# Patient Record
Sex: FEMALE | Race: WHITE | Employment: FULL TIME | URBAN - METROPOLITAN AREA
[De-identification: names, ages, dates, MRNs, and addresses within clinical notes are randomized per-mention and may not be internally consistent; named-entity substitution may affect disease eponyms.]

---

## 2017-01-20 ENCOUNTER — OFFICE VISIT (OUTPATIENT)
Dept: INTERNAL MEDICINE CLINIC | Facility: CLINIC | Age: 62
End: 2017-01-20

## 2017-01-20 VITALS
TEMPERATURE: 97.8 F | SYSTOLIC BLOOD PRESSURE: 125 MMHG | RESPIRATION RATE: 18 BRPM | HEIGHT: 68 IN | HEART RATE: 63 BPM | BODY MASS INDEX: 22.13 KG/M2 | DIASTOLIC BLOOD PRESSURE: 82 MMHG | WEIGHT: 146 LBS

## 2017-01-20 DIAGNOSIS — Z00.00 PERIODIC HEALTH ASSESSMENT, GENERAL SCREENING, ADULT: Primary | ICD-10-CM

## 2017-01-20 NOTE — PROGRESS NOTES
Chief Complaint   Patient presents with    Results     here to follow up on results     1. Have you been to the ER, urgent care clinic since your last visit? Hospitalized since your last visit? No    2. Have you seen or consulted any other health care providers outside of the [address] [place] [address] since your last visit? Include any pap smears or colon screening.  No

## 2017-08-03 ENCOUNTER — HOSPITAL ENCOUNTER (OUTPATIENT)
Dept: MAMMOGRAPHY | Age: 62
Discharge: HOME | End: 2017-08-03
Attending: INTERNAL MEDICINE
Payer: COMMERCIAL

## 2017-08-03 DIAGNOSIS — Z12.31 VISIT FOR SCREENING MAMMOGRAM: ICD-10-CM

## 2017-08-03 PROCEDURE — 77067 SCR MAMMO BI INCL CAD: CPT

## 2017-08-09 ENCOUNTER — OFFICE VISIT (OUTPATIENT)
Dept: INTERNAL MEDICINE CLINIC | Facility: CLINIC | Age: 62
End: 2017-08-09

## 2017-08-09 VITALS
BODY MASS INDEX: 23.05 KG/M2 | RESPIRATION RATE: 18 BRPM | TEMPERATURE: 96.2 F | WEIGHT: 152.1 LBS | SYSTOLIC BLOOD PRESSURE: 130 MMHG | HEART RATE: 64 BPM | HEIGHT: 68 IN | DIASTOLIC BLOOD PRESSURE: 71 MMHG | OXYGEN SATURATION: 100 %

## 2017-08-09 DIAGNOSIS — M54.2 NECK PAIN: Primary | ICD-10-CM

## 2017-08-09 DIAGNOSIS — M54.50 CHRONIC BILATERAL LOW BACK PAIN WITHOUT SCIATICA: ICD-10-CM

## 2017-08-09 DIAGNOSIS — M62.830 MUSCLE SPASM OF BACK: ICD-10-CM

## 2017-08-09 DIAGNOSIS — G89.29 CHRONIC BILATERAL LOW BACK PAIN WITHOUT SCIATICA: ICD-10-CM

## 2017-08-09 NOTE — PROGRESS NOTES
Room 8    Chief Complaint   Patient presents with    Stiff Neck     Patient needs massage and requesting a note to indicate such     1. Have you been to the ER, urgent care clinic since your last visit? Hospitalized since your last visit? Yes Reason for visit: To [facility] in May for cracked ribs    2. Have you seen or consulted any other health care providers outside of the [address] [place] [address] since your last visit? Include any pap smears or colon screening.  Yes Where: [facility]     Health Maintenance Due   Topic Date Due    Hepatitis C Screening  1955    FOBT Q 1 YEAR AGE 50-75  05/17/2007    ZOSTER VACCINE AGE 60>  03/17/2017    INFLUENZA AGE 9 TO ADULT  08/01/2017

## 2017-08-09 NOTE — ACP (ADVANCE CARE PLANNING)
Discussed with pt if she had any ACP in place. Pt stated she did not,  but would like some information. An [employer]  information packet was provided. Suggested patient look over information with her family and desired person/persons who would fulfill her wishes  for her and to make an appointment with ACP facilitator at our office.

## 2017-08-09 NOTE — PATIENT INSTRUCTIONS
Neck Spasm: Exercises  Your Care Instructions  Here are some examples of typical rehabilitation exercises for your condition. Start each exercise slowly. Ease off the exercise if you start to have pain. Your doctor or physical therapist will tell you when you can start these exercises and which ones will work best for you. How to do the exercises  Levator scapula stretch    1. Sit in a firm chair, or stand up straight. 2. Gently tilt your head toward your left shoulder. 3. Turn your head to look down into your armpit, bending your head slightly forward. Let the weight of your head stretch your neck muscles. 4. Hold for 15 to 30 seconds. 5. Return to your starting position. 6. Follow the same instructions above, but tilt your head toward your right shoulder. 7. Repeat 2 to 4 times toward each shoulder. Upper trapezius stretch    1. Sit in a firm chair, or stand up straight. 2. This stretch works best if you keep your shoulder down as you lean away from it. To help you remember to do this, start by relaxing your shoulders and lightly holding on to your thighs or your chair. 3. Tilt your head toward your shoulder and hold for 15 to 30 seconds. Let the weight of your head stretch your muscles. 4. If you would like a little added stretch, place your arm behind your back. Use the arm opposite of the direction you are tilting your head. For example, if you are tilting your head to the left, place your right arm behind your back. 5. Repeat 2 to 4 times toward each shoulder. Neck rotation    1. Sit in a firm chair, or stand up straight. 2. Keeping your chin level, turn your head to the right, and hold for 15 to 30 seconds. 3. Turn your head to the left, and hold for 15 to 30 seconds. 4. Repeat 2 to 4 times to each side. Chin tuck    1. Lie on the floor with a rolled-up towel under your neck. Your head should be touching the floor. 2. Slowly bring your chin toward the front of your neck.   3. Hold for a count of 6, and then relax for up to 10 seconds. 4. Repeat 8 to 12 times. Forward neck flexion    1. Sit in a firm chair, or stand up straight. 2. Bend your head forward. 3. Hold for 15 to 30 seconds, then return to your starting position. 4. Repeat 2 to 4 times. Follow-up care is a key part of your treatment and safety. Be sure to make and go to all appointments, and call your doctor if you are having problems. It's also a good idea to know your test results and keep a list of the medicines you take. Where can you learn more? Go to http://taylor-shey.info/. Enter P962 in the search box to learn more about \"Neck Spasm: Exercises. \"  Current as of: March 21, 2017  Content Version: 11.3  © 5294-6253 [employer]. Care instructions adapted under license by [employer] (which disclaims liability or warranty for this information). If you have questions about a medical condition or this instruction, always ask your healthcare professional. [place] [address] any warranty or liability for your use of this information.

## 2017-08-09 NOTE — LETTER
8/9/2017 9:06 AM 
 
Ms. Benedict Goncalves [address] [id number] To Whom It May Concern,  
 
Ms. Benedict Goncalves medically requires massage therapy for the following conditions: ICD-10-CM ICD-9-CM 1. Neck pain M54.2 723.1 REFERRAL TO MASSAGE THERAPY 2. Muscle spasm of back M62.830 724.8 REFERRAL TO MASSAGE THERAPY 3. Chronic bilateral low back pain without sciatica M54.5 724.2 REFERRAL TO MASSAGE THERAPY  
 G89.29 338.29 Sincerely, Sherrill Alejandra PA-C

## 2017-08-09 NOTE — PROGRESS NOTES
HISTORY OF PRESENT ILLNESS  Mickey Card is a 63 y.o. female. HPI  1) Neck stiffness/back pain   Exercising - walking 3 miles qam now. Trying to lose weight. Review of Systems   Constitutional: Negative for fever and malaise/fatigue. Musculoskeletal: Positive for back pain, myalgias and neck pain. Negative for falls and joint pain. Psychiatric/Behavioral: Negative for depression. Physical Exam   Constitutional: She is oriented to person, place, and time. She appears well-developed and well-nourished. No distress. HENT:   Head: Normocephalic and atraumatic. Neck: Neck supple. No JVD present. Cardiovascular: Normal rate, regular rhythm and normal heart sounds. Pulmonary/Chest: Effort normal and breath sounds normal. No respiratory distress. Musculoskeletal: Normal range of motion. She exhibits no edema. SLR negative B. Hip rotation B normal. B trap mm with ++ spasm. Lumbar spinal musculature B with + spasm. Neurological: She is alert and oriented to person, place, and time. Skin: Skin is warm and dry. Psychiatric: She has a normal mood and affect. Her behavior is normal. Judgment and thought content normal.   Nursing note and vitals reviewed. ASSESSMENT and PLAN    ICD-10-CM ICD-9-CM    1.  Neck pain M54.2 723.1 REFERRAL TO MASSAGE THERAPY   2. Muscle spasm of back M62.830 724.8 REFERRAL TO MASSAGE THERAPY   3. Chronic bilateral low back pain without sciatica M54.5 724.2 REFERRAL TO MASSAGE THERAPY    G89.29 338.29

## 2017-08-25 ENCOUNTER — HOSPITAL ENCOUNTER (OUTPATIENT)
Dept: MAMMOGRAPHY | Age: 62
Discharge: HOME | End: 2017-08-25
Attending: PHYSICIAN ASSISTANT
Payer: COMMERCIAL

## 2017-08-25 DIAGNOSIS — Z78.0 POSTMENOPAUSAL: ICD-10-CM

## 2017-08-25 PROCEDURE — 77080 DXA BONE DENSITY AXIAL: CPT

## 2017-08-28 PROBLEM — M85.89 OSTEOPENIA OF MULTIPLE SITES: Status: ACTIVE | Noted: 2017-08-28

## 2017-08-28 NOTE — PROGRESS NOTES
Hi Ms. Debbie Guadalupe scan shows that you have osteopenia (\"Pre-osteoporosis\") in your hip and spine. Please Take OTC Vitamin D3 5000 units once daily, OTC Calcium Citrate 500 mg twice daily, and increase weight bearing exercise. Recheck this scan in 2 years.    Shiela Pineda

## 2017-12-04 ENCOUNTER — OFFICE VISIT (OUTPATIENT)
Dept: INTERNAL MEDICINE CLINIC | Facility: CLINIC | Age: 62
End: 2017-12-04

## 2017-12-04 VITALS
BODY MASS INDEX: 23.19 KG/M2 | WEIGHT: 153 LBS | OXYGEN SATURATION: 98 % | SYSTOLIC BLOOD PRESSURE: 147 MMHG | HEIGHT: 68 IN | TEMPERATURE: 97.9 F | DIASTOLIC BLOOD PRESSURE: 78 MMHG | RESPIRATION RATE: 18 BRPM | HEART RATE: 63 BPM

## 2017-12-04 DIAGNOSIS — R03.0 ELEVATED BP WITHOUT DIAGNOSIS OF HYPERTENSION: ICD-10-CM

## 2017-12-04 DIAGNOSIS — J01.90 ACUTE NON-RECURRENT SINUSITIS, UNSPECIFIED LOCATION: Primary | ICD-10-CM

## 2017-12-04 RX ORDER — SULFAMETHOXAZOLE AND TRIMETHOPRIM 800; 160 MG/1; MG/1
1 TABLET ORAL 2 TIMES DAILY
Qty: 14 TAB | Refills: 0 | Status: SHIPPED | OUTPATIENT
Start: 2017-12-04 | End: 2019-06-17

## 2017-12-04 NOTE — PROGRESS NOTES
HISTORY OF PRESENT ILLNESS  Karina Sheets is a 63 y.o. female. Chief Complaint   Patient presents with    Sinus Infection    Cough     HPI  1) Day #12 of illness - taking Dayquil/Nyquil/Mucinex without relief. PND. Cough - sometimes productive and nasal mucus is green. Some bleeding left nostril. No fever. BP Readings from Last 3 Encounters:   12/04/17 147/78   08/09/17 130/71   01/20/17 125/82     Wt Readings from Last 3 Encounters:   12/04/17 153 lb (69.4 kg)   08/09/17 152 lb 1.6 oz (69 kg)   01/20/17 146 lb (66.2 kg)     2) BP is elevated in the office today and remains elevated at recheck. Pt has not had excess salt in her diet, has not gained weight, and has had normal BPs at prior visits. Review of Systems   Constitutional: Positive for malaise/fatigue. Negative for fever. HENT: Positive for congestion. Negative for ear pain. Respiratory: Positive for cough and sputum production. Negative for shortness of breath. Neurological: Positive for headaches. Endo/Heme/Allergies: Negative for environmental allergies. Physical Exam   Constitutional: She is oriented to person, place, and time. She appears well-developed and well-nourished. No distress. HENT:   Head: Normocephalic and atraumatic. Mouth/Throat: Oropharynx is clear and moist.   Bilateral TMs with fluid. No erythema. Nasal mucosa red, inflamed. Eyes: Conjunctivae are normal.   Neck: Neck supple. Cardiovascular: Normal rate, regular rhythm and normal heart sounds. Pulmonary/Chest: Effort normal and breath sounds normal.   Lymphadenopathy:     She has no cervical adenopathy. Neurological: She is alert and oriented to person, place, and time. Skin: Skin is warm and dry. Nursing note and vitals reviewed. ASSESSMENT and PLAN    ICD-10-CM ICD-9-CM    1. Acute non-recurrent sinusitis, unspecified location J01.90 461.9 trimethoprim-sulfamethoxazole (BACTRIM DS, SEPTRA DS) 160-800 mg per tablet   2.  Elevated BP without diagnosis of hypertension R03.0 796.2 Suspect this is elevated secondary to illness. Monitor at home and f/u if BP does not improve later this week.

## 2017-12-04 NOTE — PROGRESS NOTES
Chief Complaint   Patient presents with    Sinus Infection    Cough     1. Have you been to the ER, urgent care clinic since your last visit? Hospitalized since your last visit? No    2. Have you seen or consulted any other health care providers outside of the [address] [place] [address] since your last visit? Include any pap smears or colon screening.  No

## 2018-09-24 ENCOUNTER — HOSPITAL ENCOUNTER (OUTPATIENT)
Dept: MAMMOGRAPHY | Age: 63
Discharge: HOME | End: 2018-09-24
Attending: INTERNAL MEDICINE
Payer: COMMERCIAL

## 2018-09-24 DIAGNOSIS — Z12.31 VISIT FOR SCREENING MAMMOGRAM: ICD-10-CM

## 2018-09-24 PROCEDURE — 77067 SCR MAMMO BI INCL CAD: CPT

## 2019-06-17 ENCOUNTER — OFFICE VISIT (OUTPATIENT)
Dept: INTERNAL MEDICINE CLINIC | Facility: CLINIC | Age: 64
End: 2019-06-17

## 2019-06-17 VITALS
DIASTOLIC BLOOD PRESSURE: 72 MMHG | HEART RATE: 66 BPM | SYSTOLIC BLOOD PRESSURE: 113 MMHG | RESPIRATION RATE: 16 BRPM | BODY MASS INDEX: 22.88 KG/M2 | TEMPERATURE: 98.4 F | HEIGHT: 68 IN | WEIGHT: 151 LBS

## 2019-06-17 DIAGNOSIS — R06.09 DOE (DYSPNEA ON EXERTION): ICD-10-CM

## 2019-06-17 DIAGNOSIS — Z00.00 PHYSICAL EXAM: ICD-10-CM

## 2019-06-17 DIAGNOSIS — D72.819 LEUKOPENIA, UNSPECIFIED TYPE: Primary | ICD-10-CM

## 2019-06-17 DIAGNOSIS — Z12.4 PAP SMEAR FOR CERVICAL CANCER SCREENING: ICD-10-CM

## 2019-06-17 DIAGNOSIS — E78.5 HYPERLIPIDEMIA, UNSPECIFIED HYPERLIPIDEMIA TYPE: ICD-10-CM

## 2019-06-17 RX ORDER — HYDROGEN PEROXIDE 3 %
SOLUTION, NON-ORAL MISCELLANEOUS DAILY
COMMUNITY

## 2019-06-17 NOTE — PROGRESS NOTES
Chief Complaint   Patient presents with    Physical     physical w/pap. 1. Have you been to the ER, urgent care clinic since your last visit? Hospitalized since your last visit? Yes Where: ER Reason for visit: Dog bites. 2. Have you seen or consulted any other health care providers outside of the [address] [place] [address] since your last visit? Include any pap smears or colon screening.  No

## 2019-06-17 NOTE — PROGRESS NOTES
Subjective:      Sabi Reid is a 60 y.o. female who presents today for   Chief Complaint   Patient presents with    Physical     physical w/pap. patient in today for complete physical    Chronic abdominal pain- has seen GI    Patient has a complaint of sob over the last year. Mainly notices with exertion. She has a brother recently diagnosed with cardiomyopathy and told his heart was \"thick\"    Pap screen  paps have been normal in the past  Last pap was in 2016  Postmenopausal  No HRT      DEXA   2017 osteopenia  Takes Vit D    Colonoscopy  History of Polyps   Father with colon CA  Sees GI every 5 years    Mammogram  Sept 2018    Immunizations  tdap 2019  Refuses flu vaccine  Refuses shingrix    Ophthalmologist sees annually    Dentist sees annually    Supplements- takes Vit D, lutein, Vit E, vegetable enzymes, melatonin    MARTINEZ- family hx heart problems, 20 lbs weight gain over last 2 years, no cp, no diaphoresis, no nausea, does not smoke, denies prior hx of hyperlipidemia   Denies any history of lung problems    Patient Active Problem List    Diagnosis Date Noted    Elevated BP without diagnosis of hypertension 12/04/2017    Osteopenia of multiple sites 08/28/2017    Chronic bilateral low back pain without sciatica 08/09/2017    Muscle spasm of back 08/09/2017    Neck pain 08/09/2017    Gastroesophageal reflux disease without esophagitis 03/15/2016    Pelvic pain 03/15/2016     Current Outpatient Medications   Medication Sig Dispense Refill    esomeprazole (NEXIUM) 20 mg capsule Take  by mouth daily.  trimethoprim-sulfamethoxazole (BACTRIM DS, SEPTRA DS) 160-800 mg per tablet Take 1 Tab by mouth two (2) times a day. 14 Tab 0    TURMERIC ROOT EXTRACT PO Take  by mouth. Allergies   Allergen Reactions    Penicillins Rash     History reviewed. No pertinent past medical history. History reviewed. No pertinent surgical history.   Family History   Problem Relation Age of Onset    Breast Cancer Mother         68'sand 80's    Diabetes Mother     Hypertension Mother      Social History     Tobacco Use    Smoking status: Never Smoker    Smokeless tobacco: Never Used   Substance Use Topics    Alcohol use: Yes     Comment: social        Review of Systems    A comprehensive review of systems was negative except for that written in the HPI. Objective:     Visit Vitals  /72   Pulse 66   Temp 98.4 °F (36.9 °C) (Oral)   Resp 16   Ht 5' 8\" (1.727 m)   Wt 151 lb (68.5 kg)   BMI 22.96 kg/m²     General:  Alert, cooperative, no distress, appears stated age. Head:  Normocephalic, without obvious abnormality, atraumatic. Eyes:  Conjunctivae/corneas clear. PERRL, EOMs intact. Fundi benign. Ears:  Normal TMs and external ear canals both ears. Nose: Nares normal. Septum midline. Mucosa normal. No drainage or sinus tenderness. Throat: Lips, mucosa, and tongue normal. Teeth and gums normal.   Neck: Supple, symmetrical, trachea midline, no adenopathy, thyroid: no enlargement/tenderness/nodules, no carotid bruit and no JVD. Back:   Symmetric, no curvature. ROM normal. No CVA tenderness. Lungs:   Clear to auscultation bilaterally. Chest wall:  No tenderness or deformity. Heart:  Regular rate and rhythm, S1, S2 normal, no murmur, click, rub or gallop. Abdomen:   Soft, non-tender. Bowel sounds normal. No masses,  No organomegaly. Extremities: Extremities normal, atraumatic, no cyanosis or edema. Pulses: 2+ and symmetric all extremities. Skin: Skin color, texture, turgor normal. No rashes or lesions. Lymph nodes: Cervical, supraclavicular, and axillary nodes normal.   Neurologic: CNII-XII intact. Normal strength, sensation and reflexes throughout.   Breast: symmetrical, no masses, no adenopathy  Pelvic: normal external genitalia, no CMT, moderate vaginal wall atrophy, normal adnexa       Assessment/Plan:       ICD-10-CM ICD-9-CM    1. Leukopenia, unspecified type D72.819 288.50 CBC WITH AUTOMATED DIFF  Advised patient she should repeat in one month   2. Hyperlipidemia, unspecified hyperlipidemia type E78.5 272.4 3 month trial of diet and lifestyle changes. Patient does not want to discuss starting medication at this time   3. MARTINEZ (dyspnea on exertion) R06.09 786.09 REFERRAL TO CARDIOLOGY      XR CHEST PA LAT   4. Physical exam Z00.00 V70.9 PAP IG, RFX HPV ASCU, 16&18,45([id number])      REFERRAL TO DERMATOLOGY   5. Pap smear for cervical cancer screening Z12.4 V76.2 PAP IG, RFX HPV ASCU, 89&23,15([id number])          Advised her to call back or return to office if symptoms worsen/change/persist.  Discussed expected course/resolution/complications of diagnosis in detail with patient. Medication risks/benefits/costs/interactions/alternatives discussed with patient. She was given an after visit summary which includes diagnoses, current medications, & vitals. She expressed understanding with the diagnosis and plan.

## 2019-06-20 LAB
CYTOLOGIST CVX/VAG CYTO: NORMAL
CYTOLOGY CVX/VAG DOC CYTO: NORMAL
CYTOLOGY CVX/VAG DOC THIN PREP: NORMAL
DX ICD CODE: NORMAL
LABCORP, 190119: NORMAL
Lab: NORMAL
OTHER STN SPEC: NORMAL
STAT OF ADQ CVX/VAG CYTO-IMP: NORMAL

## 2019-07-09 ENCOUNTER — HOSPITAL ENCOUNTER (OUTPATIENT)
Dept: GENERAL RADIOLOGY | Age: 64
Discharge: HOME | End: 2019-07-09
Payer: COMMERCIAL

## 2019-07-09 DIAGNOSIS — R06.09 DOE (DYSPNEA ON EXERTION): ICD-10-CM

## 2019-07-09 PROCEDURE — 71046 X-RAY EXAM CHEST 2 VIEWS: CPT

## 2019-07-10 LAB
BASOPHILS # BLD AUTO: 0 X10E3/UL (ref 0–0.2)
BASOPHILS NFR BLD AUTO: 1 %
EOSINOPHIL # BLD AUTO: 0.1 X10E3/UL (ref 0–0.4)
EOSINOPHIL NFR BLD AUTO: 2 %
ERYTHROCYTE [DISTWIDTH] IN BLOOD BY AUTOMATED COUNT: 13.4 % (ref 12.3–15.4)
HCT VFR BLD AUTO: 39.2 % (ref 34–46.6)
HGB BLD-MCNC: 12.7 G/DL (ref 11.1–15.9)
IMM GRANULOCYTES # BLD AUTO: 0 X10E3/UL (ref 0–0.1)
IMM GRANULOCYTES NFR BLD AUTO: 0 %
LYMPHOCYTES # BLD AUTO: 1.3 X10E3/UL (ref 0.7–3.1)
LYMPHOCYTES NFR BLD AUTO: 35 %
MCH RBC QN AUTO: 30 PG (ref 26.6–33)
MCHC RBC AUTO-ENTMCNC: 32.4 G/DL (ref 31.5–35.7)
MCV RBC AUTO: 93 FL (ref 79–97)
MONOCYTES # BLD AUTO: 0.4 X10E3/UL (ref 0.1–0.9)
MONOCYTES NFR BLD AUTO: 10 %
NEUTROPHILS # BLD AUTO: 1.9 X10E3/UL (ref 1.4–7)
NEUTROPHILS NFR BLD AUTO: 52 %
PLATELET # BLD AUTO: 244 X10E3/UL (ref 150–450)
RBC # BLD AUTO: 4.24 X10E6/UL (ref 3.77–5.28)
WBC # BLD AUTO: 3.6 X10E3/UL (ref 3.4–10.8)

## 2019-08-05 ENCOUNTER — OFFICE VISIT (OUTPATIENT)
Dept: CARDIOLOGY CLINIC | Age: 64
End: 2019-08-05

## 2019-08-05 VITALS
HEART RATE: 63 BPM | OXYGEN SATURATION: 96 % | RESPIRATION RATE: 16 BRPM | WEIGHT: 156 LBS | DIASTOLIC BLOOD PRESSURE: 92 MMHG | BODY MASS INDEX: 23.64 KG/M2 | HEIGHT: 68 IN | SYSTOLIC BLOOD PRESSURE: 139 MMHG

## 2019-08-05 DIAGNOSIS — R06.00 DYSPNEA, UNSPECIFIED TYPE: Primary | ICD-10-CM

## 2019-08-05 DIAGNOSIS — E78.5 DYSLIPIDEMIA (HIGH LDL; LOW HDL): ICD-10-CM

## 2019-08-05 DIAGNOSIS — I10 ESSENTIAL HYPERTENSION: ICD-10-CM

## 2019-08-05 DIAGNOSIS — Z82.41 FAMILY HISTORY OF SUDDEN CARDIAC DEATH: ICD-10-CM

## 2019-08-05 NOTE — PATIENT INSTRUCTIONS
The only test I'd like to start with is an ECHOCARDIOGRAM. This is an ultrasound of your heart which can identify structural heart disease if present. Let's talk right after that to decide what else may be needed.

## 2019-08-05 NOTE — Clinical Note
I will start with an echo. We will probably take some talking to get her to agree to antihypertensive therapy and lipid management. Her urine analysis curiously showed a pH of 9.0.   This may be a repetition and investigation if it is real.  Thank you for this referral.

## 2019-08-05 NOTE — PROGRESS NOTES
This is an initial office visit for Ms. Jonathan Adler. She is a [address] female, primary patient of  Dr. Maximino Contreras. She is referred for cardiac assessment because of a family history of apical hypertrophic cardiomyopathy and CAD with stenting. Her brother passed oiut during exercise, was resuscitated, and subsequently discovered during hospital care to have asymmetric left ventricular apical hypertrophy (presumably obliterative type) but he also was found to have single-vessel coronary disease and he was stented. He was apparently exercising regularly and vigorously and passed out on the treadmill. He is not very communicative and this is as much information as the patient has available. Family history otherwise is remarkable in one case for congestive cardiomyopathy based on diabetes and ischemic disease and an aunt but otherwise there was no applicable information. She has gained 15 lbs last few months. She states this is because of a lapse in her usual regimen of caloric restriction and exercise. She had a stress test 2 years ago, reported negative (plain treadmill). She is noticing MARTINEZ but she is very active. Sometimes dyspnea with conversation. She has hiatal hernia with waxing and waning symptoms. Numbness in the right foot comes and goes unpredictably. On review of systems she is not aware whether she snores or not. She is  2 para 0 with no instances of unexpected fetal loss. There is no history of syncope but she does have several episodes of normal syncope widely  throughout her lifetime. She states that she \"browned out\" on a roller coaster, and she had a near fainting spell in a dentist's office when she saw blood but there were no instances of loss of consciousness. She does get occasional palpitations which she describes as rapid or pounding heartbeats that seem to occur daily with no related symptoms. She denies chest pain.   She had peripheral arterial studies done years ago without clinical indication as agreed to ED from a medical [facility] service. It was reported as normal.    She is on nothing but omeprazole in terms of prescription medications but she uses a broad collection of supplements, and she exercises regularly. She has no symptoms during exercise and the occasional palpitations or breathlessness occur entirely at rest.  For the most part she sleeps reasonably well. She offers no other complaints. She has had no exposure to tobacco or other than very briefly in early adolescence. She has had passive smoke exposure from a number of years working as a . On examination, she is a normally developed adult female appearing approximately her stated age. Height is 5 feet 8, weight is 156. Body mass index is 23.72. Blood pressure is 139/92. Respiratory rate is 16 without distress, room air  SPO2 is 96%. Jugular veins are flat in a seated position, there are no carotid bruits. Lungs are clear to auscultation. Cardiac auscultation is normal with regular rhythm, normal quality S1 and S2, no murmur, no audible gallop despite possible left atrial strain pattern on the EKG. Abdomen is negative for bruit, mass, pulsation, endorgan enlargement. Peripheral pulses are intact, there is no sign of DVT, there is no cyanosis or clubbing. Twelve-lead EKG recorded today shows mild sinus bradycardia with left atrial conduction delay. There is borderline first-degree AV block and mild left axis deviation. There is a minor disturbance of QRS conduction exclusively visible in V1 and V2 consistent with a distal right ventricular conduction delay. R wave progression is normal and there is no evidence of prior injury. Lab work from July shows a normal CBC. Urine analysis performed in June shows normal specific gravity, no unusual content, no mention of sediment, with pH greater than 9.0. This was not true in 2016 and may merit further investigation. Simultaneous serum bicarbonate level was 24 with serum sodium of 134. Serum creatinine was 0.7. Total cholesterol was elevated at 252 with HDL of 70 and LDL of 155. Hemoglobin A1c was 5.6.   Hepatic markers were normal.  Vitamin D levels have been normal.    Impression: Family history of asymmetric hypertrophic cardiomyopathy    Family history of cardiac syncope, possible risk of cardiac sudden death    There are no physical findings to support an impression of hypertrophic cardiomyopathy in this patient    Trivial conduction abnormality in the twelve-lead EKG may not be important at all    Finding of unusual alkaline urine may merit repetition and further investigation    Plan:  Echocardiography as the only tested first    Will discuss further management after the echo    She probably will need to be on hypertensive medication to be selected after the echo    She will need a discussion about lipid-lowering therapy and available options    Will defer to Dr. Lesvia Linder regarding investigation of unusual alkaline urine    Hodan Gan MD, [facility] [place]

## 2019-08-05 NOTE — PROGRESS NOTES
Chief Complaint   Patient presents with    New Patient    Shortness of Breath     Upon exertion    Dizziness     Upon exertion     1. Have you been to the ER, urgent care clinic since your last visit? Hospitalized since your last visit? No    2. Have you seen or consulted any other health care providers outside of the [address] [place] [address] since your last visit? Include any pap smears or colon screening.  No

## 2019-08-07 ENCOUNTER — OFFICE VISIT (OUTPATIENT)
Dept: INTERNAL MEDICINE CLINIC | Facility: CLINIC | Age: 64
End: 2019-08-07

## 2019-08-07 VITALS
BODY MASS INDEX: 23.04 KG/M2 | HEART RATE: 67 BPM | RESPIRATION RATE: 16 BRPM | HEIGHT: 68 IN | DIASTOLIC BLOOD PRESSURE: 82 MMHG | WEIGHT: 152 LBS | SYSTOLIC BLOOD PRESSURE: 136 MMHG | TEMPERATURE: 98.1 F

## 2019-08-07 DIAGNOSIS — K21.9 GASTROESOPHAGEAL REFLUX DISEASE WITHOUT ESOPHAGITIS: Primary | ICD-10-CM

## 2019-08-07 DIAGNOSIS — R03.0 ELEVATED BLOOD PRESSURE READING IN OFFICE WITHOUT DIAGNOSIS OF HYPERTENSION: ICD-10-CM

## 2019-08-07 DIAGNOSIS — Z87.898 HISTORY OF PREDIABETES: ICD-10-CM

## 2019-08-07 DIAGNOSIS — E78.5 HYPERLIPIDEMIA, UNSPECIFIED HYPERLIPIDEMIA TYPE: ICD-10-CM

## 2019-08-07 NOTE — PROGRESS NOTES
Chief Complaint   Patient presents with    Results     1. Have you been to the ER, urgent care clinic since your last visit? Hospitalized since your last visit? No    2. Have you seen or consulted any other health care providers outside of the [address] [place] [address] since your last visit? Include any pap smears or colon screening.  No

## 2019-08-07 NOTE — PROGRESS NOTES
Subjective:      Hayes Morales is a 60 y.o. female who presents today for   Chief Complaint   Patient presents with    Results     Patient in today for follow up    Has seen cardio in reference to family history of cardiomyopathy- echo is pending    Elevated BP- blood pressure varies on ov readings and at home    Hyperlipidemia- diet controlled    History of prediabetes- last A1c showed improvement at 5.6      Patient Active Problem List    Diagnosis Date Noted    Elevated BP without diagnosis of hypertension 12/04/2017    Osteopenia of multiple sites 08/28/2017    Chronic bilateral low back pain without sciatica 08/09/2017    Muscle spasm of back 08/09/2017    Neck pain 08/09/2017    Gastroesophageal reflux disease without esophagitis 03/15/2016    Pelvic pain 03/15/2016     Current Outpatient Medications   Medication Sig Dispense Refill    esomeprazole (NEXIUM) 20 mg capsule Take  by mouth daily. Allergies   Allergen Reactions    Penicillins Rash     History reviewed. No pertinent past medical history. History reviewed. No pertinent surgical history. Family History   Problem Relation Age of Onset    Breast Cancer Mother         68'sand 80's    Diabetes Mother     Hypertension Mother      Social History     Tobacco Use    Smoking status: Never Smoker    Smokeless tobacco: Never Used   Substance Use Topics    Alcohol use: Yes     Comment: social        Review of Systems    A comprehensive review of systems was negative except for that written in the HPI. Objective:     Visit Vitals  /82   Pulse 67   Temp 98.1 °F (36.7 °C) (Oral)   Resp 16   Ht 5' 8\" (1.727 m)   Wt 152 lb (68.9 kg)   BMI 23.11 kg/m²     General:  Alert, cooperative, no distress, appears stated age. Head:  Normocephalic, without obvious abnormality, atraumatic. Eyes:  Conjunctivae/corneas clear. PERRL, EOMs intact. Ears:  Normal TMs and external ear canals both ears. Nose: Nares normal. Septum midline. Mucosa normal. No drainage or sinus tenderness. Throat: Lips, mucosa, and tongue normal. Teeth and gums normal.   Neck: Supple, symmetrical, trachea midline, no adenopathy, thyroid: no enlargement/tenderness/nodules, no carotid bruit and no JVD. Back:   Symmetric, no curvature. ROM normal. No CVA tenderness. Lungs:   Clear to auscultation bilaterally. Chest wall:  No tenderness or deformity. Heart:  Regular rate and rhythm, S1, S2 normal, no murmur, click, rub or gallop. Abdomen:   Soft, non-tender. Bowel sounds normal. No masses,  No organomegaly. Extremities: Extremities normal, atraumatic, no cyanosis or edema. Pulses: 2+ and symmetric all extremities. Skin: Skin color, texture, turgor normal. No rashes or lesions. Lymph nodes: Cervical, supraclavicular, and axillary nodes normal.   Neurologic: CNII-XII intact. Normal strength, sensation and reflexes throughout. Assessment/Plan:       ICD-10-CM ICD-9-CM    1. Gastroesophageal reflux disease without esophagitis K21.9 530.81 Continue PPI   2. Elevated blood pressure reading in office without diagnosis of hypertension R03.0 796.2 Monitor BP at next ov and will log BP at home  Reduce salt  Increase exercise     3. Hyperlipidemia, unspecified hyperlipidemia type E78.5 272.4 3 months lifestyle changes  Low chol diet and exercise   4. History of prediabetes Z87.898 V12.29 Dietary modifications  exercise          Advised her to call back or return to office if symptoms worsen/change/persist.  Discussed expected course/resolution/complications of diagnosis in detail with patient. Medication risks/benefits/costs/interactions/alternatives discussed with patient. She was given an after visit summary which includes diagnoses, current medications, & vitals. She expressed understanding with the diagnosis and plan.

## 2019-08-13 ENCOUNTER — HOSPITAL ENCOUNTER (OUTPATIENT)
Dept: NON INVASIVE DIAGNOSTICS | Age: 64
Discharge: HOME | End: 2019-08-13
Attending: INTERNAL MEDICINE

## 2019-08-13 DIAGNOSIS — R06.00 DYSPNEA, UNSPECIFIED TYPE: ICD-10-CM

## 2019-08-13 DIAGNOSIS — Z82.41 FAMILY HISTORY OF SUDDEN CARDIAC DEATH: ICD-10-CM

## 2019-08-19 ENCOUNTER — HOSPITAL ENCOUNTER (OUTPATIENT)
Dept: NON INVASIVE DIAGNOSTICS | Age: 64
Discharge: HOME | End: 2019-08-19
Attending: INTERNAL MEDICINE
Payer: COMMERCIAL

## 2019-08-19 VITALS
BODY MASS INDEX: 23.04 KG/M2 | DIASTOLIC BLOOD PRESSURE: 82 MMHG | HEIGHT: 68 IN | SYSTOLIC BLOOD PRESSURE: 136 MMHG | WEIGHT: 152 LBS

## 2019-08-19 LAB
ECHO AO ROOT DIAM: 3.3 CM
ECHO AV PEAK GRADIENT: 4.8 MMHG
ECHO AV PEAK VELOCITY: 109.16 CM/S
ECHO LA MAJOR AXIS: 3.57 CM
ECHO LA TO AORTIC ROOT RATIO: 1.08
ECHO LV INTERNAL DIMENSION DIASTOLIC: 4.41 CM (ref 3.9–5.3)
ECHO LV INTERNAL DIMENSION SYSTOLIC: 2.9 CM
ECHO LV IVSD: 0.91 CM (ref 0.6–0.9)
ECHO LV MASS 2D: 146.9 G (ref 67–162)
ECHO LV MASS INDEX 2D: 80.8 G/M2 (ref 43–95)
ECHO LV POSTERIOR WALL DIASTOLIC: 0.89 CM (ref 0.6–0.9)
ECHO MV A VELOCITY: 70.44 CM/S
ECHO MV AREA PHT: 3.4 CM2
ECHO MV E DECELERATION TIME (DT): 222.5 MS
ECHO MV E VELOCITY: 63.42 CM/S
ECHO MV E/A RATIO: 0.9
ECHO MV PRESSURE HALF TIME (PHT): 64.5 MS
ECHO PV MAX VELOCITY: 67.76 CM/S
ECHO PV PEAK GRADIENT: 1.8 MMHG
ECHO RV INTERNAL DIMENSION: 3.19 CM
ECHO RV TAPSE: 1.91 CM (ref 1.5–2)
ECHO TV REGURGITANT MAX VELOCITY: 249.89 CM/S
ECHO TV REGURGITANT PEAK GRADIENT: 25 MMHG

## 2019-08-19 PROCEDURE — 0399T ECHO ADULT COMPLETE: CPT

## 2019-08-30 ENCOUNTER — OFFICE VISIT (OUTPATIENT)
Dept: CARDIOLOGY CLINIC | Age: 64
End: 2019-08-30

## 2019-08-30 VITALS
DIASTOLIC BLOOD PRESSURE: 88 MMHG | HEART RATE: 68 BPM | WEIGHT: 155 LBS | SYSTOLIC BLOOD PRESSURE: 144 MMHG | RESPIRATION RATE: 16 BRPM | HEIGHT: 68 IN | BODY MASS INDEX: 23.49 KG/M2 | OXYGEN SATURATION: 99 %

## 2019-08-30 DIAGNOSIS — I10 ESSENTIAL HYPERTENSION: ICD-10-CM

## 2019-08-30 DIAGNOSIS — E78.5 DYSLIPIDEMIA (HIGH LDL; LOW HDL): ICD-10-CM

## 2019-08-30 DIAGNOSIS — Z82.41 FAMILY HISTORY OF SUDDEN CARDIAC DEATH: ICD-10-CM

## 2019-08-30 DIAGNOSIS — R06.00 DYSPNEA, UNSPECIFIED TYPE: Primary | ICD-10-CM

## 2019-08-30 NOTE — PROGRESS NOTES
Chief Complaint   Patient presents with    Follow-up    Results     Echo 8/19/19    Breathing Problem    Hypertension     No HTN diagnosis     1. Have you been to the ER, urgent care clinic since your last visit? Hospitalized since your last visit? No    2. Have you seen or consulted any other health care providers outside of the [address] [place] [address] since your last visit? Include any pap smears or colon screening.  No

## 2019-08-30 NOTE — PATIENT INSTRUCTIONS
Please follow your home blood pressure and record it, but no more than once a day. I would not be eager to add drug therapy for today's blood pressure. If I were going to add something intended mostly to blunt a stress response, it would be something in the family of Beta Blockers. I will leave this to Dr. Cassie Begum but I can be called whenever either of you wish.

## 2019-10-15 NOTE — PROGRESS NOTES
Jasmine Bowles is a 60year old lady, primary patient of Dr. Flynn Arriaga, first referred for cardiac evaluation in July of this year because of a complaint of dyspnea. After a missed appointment, she was first evaluated on August 5, 2019. She has a family history of asymmetric hypertrophic cardiomyopathy and cardiac sudden death in a male sublingt, who also turned out to have single vessel CAD. She also has another relative with congestive cardiomyopathy. She underwent echocardiography priot to treatment selection:    Echo on 8/19/19 was reported as follows:    · Left Ventricle: Normal cavity size, wall thickness and systolic function (ejection fraction normal). Estimated left ventricular ejection fraction is 61 - 65%. Visually measured ejection fraction. No regional wall motion abnormality noted. Normal left ventricular strain. Moderate (grade 2) left ventricular diastolic dysfunction with reversible defects. No ventricular septal defect present in the left ventricle. · Pulmonary Artery: There is no evidence of pulmonary hypertension. · Interatrial Septum: Color flow Doppler was used. · Aortic Valve: Mild aortic valve leaflet calcification present. There is concentric LVH with high ejection fraction and without any regional abnormalities. There is moderate diastolic impairment. There is mild calcification of the aortic annulus but leaflet motion is normal and the valve is only trivially insufficient. Cardiac output is well-maintained, no other abnormalities are noted.      Echo Findings     Left Ventricle Normal cavity size, wall thickness and systolic function (ejection fraction normal). No left ventricular speckled myocardium observed. The muscle mass is normal. The cavity shape is normal. Normal septal motion. The estimated ejection fraction is 61 - 65%. Visually measured ejection fraction. No regional wall motion abnormality noted. End-systolic volume is normal. Normal left ventricular strain. Strain JEN: -24. There is moderate (grade 2) left ventricular diastolic dysfunction with reversible defects. Normal left ventricular diastolic pressure. End-diastolic volume is normal. The findings are consistent with hypertrophic cardiomyopathy without obstruction. No ventricular septal defect. No left ventricular non-compaction. No spontaneous echo contrastThere is no thrombus. There is no mass. Left ventricle is hypertrophic in a concentric pattern without obstruction and with an above normal ejection fraction as well as moderate diastolic impairment. There are no regional abnormalities. Wall Scoring The left ventricular wall motion is normal.            Left Atrium Normal cavity size. No atrial septal defect present. No patent foramen ovale visualized. There is no thrombus. There is no mass. No spontaneous echo contrast. Interatrial septal bowing left to right. Left atrial appendage not assessed. Right Ventricle Normal cavity size, wall thickness and global systolic function. There is no thrombus. No mass is present. Right Atrium Normal cavity size and volume. There is no thrombus. There is no mass. No spontaneous echo contrast used. Interatrial Septum Color flow Doppler was used. No atrial septal defect present. Interatrial septal bowing left to right. No interatrial septal aneurysm present. . There appears to be no lipomatous hypertrophy of the interatrial septum. Aortic Valve Normal valve structure, trileaflet valve structure, no stenosis and no regurgitation. There is mild left leaflet calcification. Normal aortic leaflet mobility. There is no vegetation on the aortic valve. There is no mass on the aortic valve. Mitral Valve Normal valve structure, no stenosis and no regurgitation. There is no vegetation on the mitral valve. Tricuspid Valve Normal valve structure, no stenosis and no regurgitation. There is no evidence of pulmonary hypertension.    Pulmonic Valve Pulmonic valve not well visualized. Normal valve structure, no stenosis and no regurgitation. Aorta Normal aortic root, ascending aortic, and aortic arch. IVC/Hepatic Veins Normal structure. Normal central venous pressure (0-5 mmHg); IVC diameter is less than 21 mm and collapses more than 50% with respiration. Pericardium Normal pericardium and no evidence of pericardial effusion. She offers no new complaints at this time. She specifically denies lightheadedness, syncope, palpitations, episodes of weakness, paroxysmal nocturnal dyspnea, orthopnea, and any other sleep disturbance. She is noticing exertional dyspnea during the day but she exercises vigorously as previously noted. There is no chest pain involved. On examination, she weighs 155 pounds. Height is 5 feet 8 inches, body mass index is 23.57. Pulse is 68 and regular. Respirations unlabored with room air SPO2 of 99%. Blood pressure is 144/88 in the left arm. Cardiac auscultation shows regular rhythm with normal quality S1 and S2. There is no gallop or murmur. PMI is normal.  Peripheral pulses are intact, lungs are clear, abdomen is negative for pulsation and bruit. Neck vessels are normal.    Impression: Labile hypertension    Concentric type left ventricular hypertrophy without obstruction and without regional abnormalities    With no immediate need to add medication, but given the family history my empiric choice would be a beta-blocker    Plan:  Home blood pressure diary    No change in treatment today    Return to the care of Dr. Danny Corcoran    Return to this office as needed    If blood pressure treatment is initiated and dyspnea on exertion does not improve we will consider stress testing in the future.     Thank you for this referral    Eva Shaikh MD, [facility]

## 2019-12-18 ENCOUNTER — HOSPITAL ENCOUNTER (OUTPATIENT)
Dept: ULTRASOUND IMAGING | Age: 64
Discharge: HOME | End: 2019-12-18
Payer: COMMERCIAL

## 2019-12-18 DIAGNOSIS — R10.11 RIGHT UPPER QUADRANT PAIN: ICD-10-CM

## 2019-12-18 PROCEDURE — 76705 ECHO EXAM OF ABDOMEN: CPT

## 2019-12-30 ENCOUNTER — HOSPITAL ENCOUNTER (OUTPATIENT)
Dept: GENERAL RADIOLOGY | Age: 64
Discharge: HOME | End: 2019-12-30
Payer: COMMERCIAL

## 2019-12-30 DIAGNOSIS — R06.02 SHORTNESS OF BREATH: ICD-10-CM

## 2019-12-30 PROCEDURE — 71046 X-RAY EXAM CHEST 2 VIEWS: CPT

## 2020-01-10 ENCOUNTER — OFFICE VISIT (OUTPATIENT)
Dept: INTERNAL MEDICINE CLINIC | Age: 65
End: 2020-01-10

## 2020-01-10 VITALS
BODY MASS INDEX: 23.9 KG/M2 | RESPIRATION RATE: 18 BRPM | TEMPERATURE: 98.1 F | HEART RATE: 62 BPM | SYSTOLIC BLOOD PRESSURE: 144 MMHG | WEIGHT: 157.7 LBS | DIASTOLIC BLOOD PRESSURE: 80 MMHG | OXYGEN SATURATION: 99 % | HEIGHT: 68 IN

## 2020-01-10 DIAGNOSIS — R10.9 ABDOMINAL DISCOMFORT: ICD-10-CM

## 2020-01-10 DIAGNOSIS — K21.9 GASTROESOPHAGEAL REFLUX DISEASE WITHOUT ESOPHAGITIS: Primary | ICD-10-CM

## 2020-01-10 DIAGNOSIS — Z12.39 BREAST CANCER SCREENING: ICD-10-CM

## 2020-01-10 NOTE — PROGRESS NOTES
Chief Complaint   Patient presents with    Abdominal Pain     1. Have you been to the ER, urgent care clinic since your last visit? Hospitalized since your last visit? Yes, seen at [facility] Dec 20 for right rib pain. 2. Have you seen or consulted any other health care providers outside of the [address] [place] [address] since your last visit? Include any pap smears or colon screening.  No

## 2020-01-10 NOTE — PROGRESS NOTES
Subjective:      Kayli Fam is a 60 y.o. female who presents today for   Chief Complaint   Patient presents with    Abdominal Pain     hypertension- has seen cardio, monitors blood pressure, not placed on any antihypertensive at this time  Echo done and reviewed    She has abd pain at baseline, no real changes, she tries to eat  gluten free, and is lactose intolerant    Had dull ache under ribs on right. She was seen at [facility], she had blood work and ultrasound  No significant findings  She took ibuprofen which did help      Patient Active Problem List    Diagnosis Date Noted    Elevated BP without diagnosis of hypertension 12/04/2017    Osteopenia of multiple sites 08/28/2017    Chronic bilateral low back pain without sciatica 08/09/2017    Muscle spasm of back 08/09/2017    Neck pain 08/09/2017    Gastroesophageal reflux disease without esophagitis 03/15/2016    Pelvic pain 03/15/2016     Current Outpatient Medications   Medication Sig Dispense Refill    fluticasone furoate (FLONASE SENSIMIST NA) by Nasal route.  esomeprazole (NEXIUM) 20 mg capsule Take  by mouth daily. Allergies   Allergen Reactions    Penicillins Rash     History reviewed. No pertinent past medical history. History reviewed. No pertinent surgical history. Family History   Problem Relation Age of Onset    Breast Cancer Mother         68'sand 80's    Diabetes Mother     Hypertension Mother      Social History     Tobacco Use    Smoking status: Never Smoker    Smokeless tobacco: Never Used   Substance Use Topics    Alcohol use: Yes     Comment: social        Review of Systems    A comprehensive review of systems was negative except for that written in the HPI. Objective:     Visit Vitals  /80   Pulse 62   Temp 98.1 °F (36.7 °C) (Oral)   Resp 18   Ht 5' 8\" (1.727 m)   Wt 157 lb 11.2 oz (71.5 kg)   SpO2 99%   BMI 23.98 kg/m²     General:  Alert, cooperative, no distress, appears stated age.    Head: Normocephalic, without obvious abnormality, atraumatic. Eyes:  Conjunctivae/corneas clear. PERRL, EOMs intact. Fundi benign. Ears:  Normal TMs and external ear canals both ears. Nose: Nares normal. Septum midline. Mucosa normal. No drainage or sinus tenderness. Throat: Lips, mucosa, and tongue normal. Teeth and gums normal.   Neck: Supple, symmetrical, trachea midline, no adenopathy, thyroid: no enlargement/tenderness/nodules, no carotid bruit and no JVD. Back:   Symmetric, no curvature. ROM normal. No CVA tenderness. Lungs:   Clear to auscultation bilaterally. Chest wall:  No tenderness or deformity. Heart:  Regular rate and rhythm, S1, S2 normal, no murmur, click, rub or gallop. Abdomen:   Soft, non-tender. Bowel sounds normal. No masses,  No organomegaly. Tender along lower ribs on right   Extremities: Extremities normal, atraumatic, no cyanosis or edema. Pulses: 2+ and symmetric all extremities. Assessment/Plan:       ICD-10-CM ICD-9-CM    1. Gastroesophageal reflux disease without esophagitis K21.9 530.81 Continue nexium  Avoid reflux triggers     2. Abdominal discomfort R10.9 789.00 Likely costochondritis  Negative RUQ ultrasound  Discussed NSAIDS, muscle relaxers  Patient will monitor and follow up with me if persists   3. Breast cancer screening Z12.39 V76.10 [facility] MAMMO BI SCREENING INCL CAD          Advised her to call back or return to office if symptoms worsen/change/persist.  Discussed expected course/resolution/complications of diagnosis in detail with patient. Medication risks/benefits/costs/interactions/alternatives discussed with patient. She was given an after visit summary which includes diagnoses, current medications, & vitals. She expressed understanding with the diagnosis and plan.

## 2020-04-01 ENCOUNTER — HOSPITAL ENCOUNTER (OUTPATIENT)
Dept: PULMONOLOGY | Age: 65
Discharge: HOME | End: 2020-04-01
Attending: INTERNAL MEDICINE

## 2020-04-01 DIAGNOSIS — J98.4 SMALL AIRWAYS DISEASE: ICD-10-CM

## 2020-06-23 ENCOUNTER — HOSPITAL ENCOUNTER (OUTPATIENT)
Dept: MAMMOGRAPHY | Age: 65
Discharge: HOME | End: 2020-06-23
Attending: INTERNAL MEDICINE
Payer: COMMERCIAL

## 2020-06-23 DIAGNOSIS — Z12.39 BREAST CANCER SCREENING: ICD-10-CM

## 2020-06-23 PROCEDURE — 77067 SCR MAMMO BI INCL CAD: CPT

## 2020-09-09 ENCOUNTER — OFFICE VISIT (OUTPATIENT)
Dept: INTERNAL MEDICINE CLINIC | Age: 65
End: 2020-09-09
Payer: COMMERCIAL

## 2020-09-09 VITALS
SYSTOLIC BLOOD PRESSURE: 144 MMHG | DIASTOLIC BLOOD PRESSURE: 77 MMHG | RESPIRATION RATE: 16 BRPM | TEMPERATURE: 97.6 F | BODY MASS INDEX: 23.79 KG/M2 | WEIGHT: 157 LBS | HEART RATE: 65 BPM | OXYGEN SATURATION: 100 % | HEIGHT: 68 IN

## 2020-09-09 DIAGNOSIS — Z23 ENCOUNTER FOR IMMUNIZATION: Primary | ICD-10-CM

## 2020-09-09 DIAGNOSIS — D72.819 LEUKOPENIA, UNSPECIFIED TYPE: ICD-10-CM

## 2020-09-09 DIAGNOSIS — I10 ESSENTIAL HYPERTENSION: ICD-10-CM

## 2020-09-09 DIAGNOSIS — E55.9 VITAMIN D DEFICIENCY: ICD-10-CM

## 2020-09-09 DIAGNOSIS — Z87.898 HISTORY OF PREDIABETES: ICD-10-CM

## 2020-09-09 DIAGNOSIS — Z01.818 PREOP EXAMINATION: ICD-10-CM

## 2020-09-09 DIAGNOSIS — K21.9 GASTROESOPHAGEAL REFLUX DISEASE WITHOUT ESOPHAGITIS: ICD-10-CM

## 2020-09-09 DIAGNOSIS — E78.5 HYPERLIPIDEMIA, UNSPECIFIED HYPERLIPIDEMIA TYPE: ICD-10-CM

## 2020-09-09 PROCEDURE — 99214 OFFICE O/P EST MOD 30 MIN: CPT

## 2020-09-09 PROCEDURE — 90686 IIV4 VACC NO PRSV 0.5 ML IM: CPT

## 2020-09-09 RX ORDER — ALBUTEROL SULFATE 90 UG/1
2 AEROSOL, METERED RESPIRATORY (INHALATION)
Qty: 1 INHALER | Refills: 1 | Status: SHIPPED | OUTPATIENT
Start: 2020-09-09

## 2020-09-09 RX ORDER — DOCUSATE SODIUM 100 MG/1
100 CAPSULE, LIQUID FILLED ORAL 2 TIMES DAILY
Qty: 60 CAP | Refills: 2 | Status: SHIPPED | OUTPATIENT
Start: 2020-09-09 | End: 2020-12-08

## 2020-09-09 NOTE — PROGRESS NOTES
Chief Complaint   Patient presents with    Pre-op Exam     1. Have you been to the ER, urgent care clinic since your last visit? Hospitalized since your last visit? No    2. Have you seen or consulted any other health care providers outside of the [address] [place] [address] since your last visit? Include any pap smears or colon screening.  Yes seen by Dr. Mendez pulmonologist.

## 2020-09-09 NOTE — PROGRESS NOTES
Subjective:      Alex Willett is a 63 y.o. female who presents today for   Chief Complaint   Patient presents with    Pre-op Exam   see pre op exam scanned in chart  Would like a flu vaccine today  Would like to try albuterol as needed. She has seen pulmonary and has questionable diagnosis of asthma  Continues to have abdominal discomfort at times, constipation- she has plans to see GI    Patient Active Problem List    Diagnosis Date Noted    Elevated BP without diagnosis of hypertension 12/04/2017    Osteopenia of multiple sites 08/28/2017    Chronic bilateral low back pain without sciatica 08/09/2017    Muscle spasm of back 08/09/2017    Neck pain 08/09/2017    Gastroesophageal reflux disease without esophagitis 03/15/2016    Pelvic pain 03/15/2016     Current Outpatient Medications   Medication Sig Dispense Refill    fluticasone furoate (FLONASE SENSIMIST NA) by Nasal route.  esomeprazole (NEXIUM) 20 mg capsule Take  by mouth daily. Allergies   Allergen Reactions    Penicillins Rash     History reviewed. No pertinent past medical history. History reviewed. No pertinent surgical history. Family History   Problem Relation Age of Onset    Breast Cancer Mother         68'sand 80's    Diabetes Mother     Hypertension Mother      Social History     Tobacco Use    Smoking status: Never Smoker    Smokeless tobacco: Never Used   Substance Use Topics    Alcohol use: Yes     Comment: social        Review of Systems    A comprehensive review of systems was negative except for that written in the HPI. Objective:     Visit Vitals  /77   Pulse 65   Temp 97.6 °F (36.4 °C) (Oral)   Resp 16   Ht 5' 8\" (1.727 m)   Wt 157 lb (71.2 kg)   SpO2 100%   BMI 23.87 kg/m²     General:  Alert, cooperative, no distress, appears stated age. Head:  Normocephalic, without obvious abnormality, atraumatic. Eyes:  Conjunctivae/corneas clear. PERRL, EOMs intact. Fundi benign.    Ears:  Normal TMs and external ear canals both ears. Nose: Nares normal. Septum midline. Mucosa normal. No drainage or sinus tenderness. Throat: Lips, mucosa, and tongue normal. Teeth and gums normal.   Neck: Supple, symmetrical, trachea midline, no adenopathy, thyroid: no enlargement/tenderness/nodules, no carotid bruit and no JVD. Back:   Symmetric, no curvature. ROM normal. No CVA tenderness. Lungs:   Clear to auscultation bilaterally. Chest wall:  No tenderness or deformity. Heart:  Regular rate and rhythm, S1, S2 normal, no murmur, click, rub or gallop. Abdomen:   Soft, non-tender. Bowel sounds normal. No masses,  No organomegaly. Extremities: Extremities normal, atraumatic, no cyanosis or edema. Pulses: 2+ and symmetric all extremities. Skin: Skin color, texture, turgor normal. No rashes or lesions. Lymph nodes: Cervical, supraclavicular, and axillary nodes normal.   Neurologic: CNII-XII intact. Normal strength, sensation and reflexes throughout. Assessment/Plan:       ICD-10-CM ICD-9-CM    1. Encounter for immunization  Z23 V03.89 INFLUENZA VIRUS VAC QUAD,SPLIT,PRESV FREE SYRINGE IM      [place] IMMUNIZ ADMIN,1 SINGLE/COMB VAC/TOXOID   2. Gastroesophageal reflux disease without esophagitis  K21.9 530.81    3. Hyperlipidemia, unspecified hyperlipidemia type  E78.5 272.4 NMR LIPOPROFILE WITH LIPIDS (WITHOUT GRAPH)   4. History of prediabetes  Z87.898 V12.29 HEMOGLOBIN A1C W/O EAG   5. Leukopenia, unspecified type  D72.819 288.50 CBC WITH AUTOMATED DIFF   6. Essential hypertension  K97 651.0 METABOLIC PANEL, COMPREHENSIVE      URINALYSIS W/ RFLX MICROSCOPIC   7. Vitamin D deficiency  E55.9 268.9 VITAMIN D, 25 HYDROXY   8. Preop examination  Z01.818 V72.84           Advised her to call back or return to office if symptoms worsen/change/persist.  Discussed expected course/resolution/complications of diagnosis in detail with patient.     Medication risks/benefits/costs/interactions/alternatives discussed with patient. She was given an after visit summary which includes diagnoses, current medications, & vitals. She expressed understanding with the diagnosis and plan.

## 2020-10-30 LAB
25(OH)D3+25(OH)D2 SERPL-MCNC: 28.2 NG/ML (ref 30–100)
ALBUMIN SERPL-MCNC: 4.6 G/DL (ref 3.8–4.8)
ALBUMIN/GLOB SERPL: 2.2 {RATIO} (ref 1.2–2.2)
ALP SERPL-CCNC: 103 IU/L (ref 39–117)
ALT SERPL-CCNC: 16 IU/L (ref 0–32)
APPEARANCE UR: CLEAR
AST SERPL-CCNC: 18 IU/L (ref 0–40)
BASOPHILS # BLD AUTO: 0 X10E3/UL (ref 0–0.2)
BASOPHILS NFR BLD AUTO: 1 %
BILIRUB SERPL-MCNC: 0.4 MG/DL (ref 0–1.2)
BILIRUB UR QL STRIP: NEGATIVE
BUN SERPL-MCNC: 13 MG/DL (ref 8–27)
BUN/CREAT SERPL: 18 (ref 12–28)
CALCIUM SERPL-MCNC: 9.3 MG/DL (ref 8.7–10.3)
CHLORIDE SERPL-SCNC: 101 MMOL/L (ref 96–106)
CHOLEST SERPL-MCNC: 259 MG/DL (ref 100–199)
CO2 SERPL-SCNC: 21 MMOL/L (ref 20–29)
COLOR UR: YELLOW
CREAT SERPL-MCNC: 0.71 MG/DL (ref 0.57–1)
EOSINOPHIL # BLD AUTO: 0.1 X10E3/UL (ref 0–0.4)
EOSINOPHIL NFR BLD AUTO: 2 %
ERYTHROCYTE [DISTWIDTH] IN BLOOD BY AUTOMATED COUNT: 12.5 % (ref 11.7–15.4)
GLOBULIN SER CALC-MCNC: 2.1 G/DL (ref 1.5–4.5)
GLUCOSE SERPL-MCNC: 105 MG/DL (ref 65–99)
GLUCOSE UR QL: NEGATIVE
HBA1C MFR BLD: 5.6 % (ref 4.8–5.6)
HCT VFR BLD AUTO: 38.6 % (ref 34–46.6)
HDL SERPL-SCNC: 30.6 UMOL/L
HDLC SERPL-MCNC: 62 MG/DL
HGB BLD-MCNC: 12.9 G/DL (ref 11.1–15.9)
HGB UR QL STRIP: NEGATIVE
IMM GRANULOCYTES # BLD AUTO: 0 X10E3/UL (ref 0–0.1)
IMM GRANULOCYTES NFR BLD AUTO: 0 %
KETONES UR QL STRIP: NEGATIVE
LDL SERPL QN: 22 NM
LDL SERPL-SCNC: 1744 NMOL/L
LDL SMALL SERPL-SCNC: 235 NMOL/L
LDLC SERPL CALC-MCNC: 181 MG/DL (ref 0–99)
LEUKOCYTE ESTERASE UR QL STRIP: NEGATIVE
LP-IR SCORE SERPL: <25
LYMPHOCYTES # BLD AUTO: 1.3 X10E3/UL (ref 0.7–3.1)
LYMPHOCYTES NFR BLD AUTO: 38 %
MCH RBC QN AUTO: 30.4 PG (ref 26.6–33)
MCHC RBC AUTO-ENTMCNC: 33.4 G/DL (ref 31.5–35.7)
MCV RBC AUTO: 91 FL (ref 79–97)
MICRO URNS: ABNORMAL
MONOCYTES # BLD AUTO: 0.3 X10E3/UL (ref 0.1–0.9)
MONOCYTES NFR BLD AUTO: 10 %
NEUTROPHILS # BLD AUTO: 1.7 X10E3/UL (ref 1.4–7)
NEUTROPHILS NFR BLD AUTO: 49 %
NITRITE UR QL STRIP: NEGATIVE
PH UR STRIP: 8 [PH] (ref 5–7.5)
PLATELET # BLD AUTO: 232 X10E3/UL (ref 150–450)
POTASSIUM SERPL-SCNC: 4.5 MMOL/L (ref 3.5–5.2)
PROT SERPL-MCNC: 6.7 G/DL (ref 6–8.5)
PROT UR QL STRIP: NEGATIVE
RBC # BLD AUTO: 4.25 X10E6/UL (ref 3.77–5.28)
SODIUM SERPL-SCNC: 135 MMOL/L (ref 134–144)
SP GR UR: 1.02 (ref 1–1.03)
TRIGL SERPL-MCNC: 94 MG/DL (ref 0–149)
UROBILINOGEN UR STRIP-MCNC: 1 MG/DL (ref 0.2–1)
WBC # BLD AUTO: 3.4 X10E3/UL (ref 3.4–10.8)

## 2020-12-07 ENCOUNTER — VIRTUAL VISIT (OUTPATIENT)
Dept: INTERNAL MEDICINE CLINIC | Age: 65
End: 2020-12-07
Payer: COMMERCIAL

## 2020-12-07 DIAGNOSIS — R06.00 DYSPNEA, UNSPECIFIED TYPE: ICD-10-CM

## 2020-12-07 DIAGNOSIS — E78.5 HYPERLIPIDEMIA, UNSPECIFIED HYPERLIPIDEMIA TYPE: Primary | ICD-10-CM

## 2020-12-07 PROCEDURE — 99214 OFFICE O/P EST MOD 30 MIN: CPT | Performed by: INTERNAL MEDICINE

## 2020-12-07 RX ORDER — ROSUVASTATIN CALCIUM 5 MG/1
5 TABLET, COATED ORAL
Qty: 30 TAB | Refills: 3 | Status: SHIPPED | OUTPATIENT
Start: 2020-12-07

## 2020-12-07 NOTE — PROGRESS NOTES
Keyonna Doss is a 63 y.o. female who was seen by synchronous (real-time) audio-video technology on 12/7/2020 for No chief complaint on file. Assessment & Plan:   Diagnoses and all orders for this visit:    1. Hyperlipidemia, unspecified hyperlipidemia type  -     REFERRAL TO CARDIOLOGY    2. Dyspnea, unspecified type  -     REFERRAL TO CARDIOLOGY    Other orders  -     rosuvastatin (CRESTOR) 5 mg tablet; Take 1 Tab by mouth nightly. Reviewed NMR lipoprofile with patient and discussed in detail, I have advised statin therapy and reviewed most common side effects including myalgias and transaminitis. I let patient know we would watch her closely. She states she would like to get a second opinion from cardiology. Referral to Dr. Mary Lou Rogers      Subjective:     Hyperlipidemia:  Patient in today for follow up visit and to discuss cholesterol    Vit D deficiency: Also recent labs showed low vit D  Has started supplement      Prediabetes:  A1c 5.6- shows improvement! Prior to Admission medications    Medication Sig Start Date End Date Taking? Authorizing Provider   docusate sodium (COLACE) 100 mg capsule Take 1 Cap by mouth two (2) times a day for 90 days. 9/9/20 12/8/20  Estefani Hope MD   albuterol (PROVENTIL HFA, VENTOLIN HFA, PROAIR HFA) 90 mcg/actuation inhaler Take 2 Puffs by inhalation every four (4) hours as needed for Wheezing or Shortness of Breath. 9/9/20   Estefani Hope MD   fluticasone furoate (FLONASE SENSIMIST NA) by Nasal route. Provider, Historical   esomeprazole (NEXIUM) 20 mg capsule Take  by mouth daily. Provider, Historical         Review of Systems   Constitutional: Negative for weight loss. Eyes: Negative for blurred vision. Respiratory: Negative for shortness of breath. Cardiovascular: Negative for chest pain and leg swelling. Genitourinary: Negative for frequency and urgency. Musculoskeletal: Negative for joint pain. Neurological: Negative for headaches. Objective:   No flowsheet data found. [INSTRUCTIONS:  \"[x]\" Indicates a positive item  \"[]\" Indicates a negative item  -- DELETE ALL ITEMS NOT EXAMINED]    Constitutional: [x] Appears well-developed and well-nourished [x] No apparent distress      [] Abnormal -     Mental status: [x] Alert and awake  [x] Oriented to person/place/time [x] Able to follow commands    [] Abnormal -     Eyes:   EOM    [x]  Normal    [] Abnormal -   Sclera  [x]  Normal    [] Abnormal -          Discharge [x]  None visible   [] Abnormal -     HENT: [x] Normocephalic, atraumatic  [] Abnormal -   [x] Mouth/Throat: Mucous membranes are moist    External Ears [x] Normal  [] Abnormal -    Neck: [x] No visualized mass [] Abnormal -     Pulmonary/Chest: [x] Respiratory effort normal   [x] No visualized signs of difficulty breathing or respiratory distress        [] Abnormal -      Musculoskeletal:   [x] Normal gait with no signs of ataxia         [x] Normal range of motion of neck        [] Abnormal -     Neurological:        [x] No Facial Asymmetry (Cranial nerve 7 motor function) (limited exam due to video visit)          [x] No gaze palsy        [] Abnormal -          Skin:        [x] No significant exanthematous lesions or discoloration noted on facial skin         [] Abnormal -            Psychiatric:       [x] Normal Affect [] Abnormal -        [x] No Hallucinations    Other pertinent observable physical exam findings:-        We discussed the expected course, resolution and complications of the diagnosis(es) in detail. Medication risks, benefits, costs, interactions, and alternatives were discussed as indicated. I advised her to contact the office if her condition worsens, changes or fails to improve as anticipated. She expressed understanding with the diagnosis(es) and plan.        Rylan Duarte, who was evaluated through a patient-initiated, synchronous (real-time) audio-video encounter, and/or her healthcare decision maker, is aware that it is a billable service, with coverage as determined by her insurance carrier. She provided verbal consent to proceed: Yes, and patient identification was verified. It was conducted pursuant to the emergency declaration under the [address] [place] [address] authority and the [employer] Act. A caregiver was present when appropriate. Ability to conduct physical exam was limited. I was at home. The patient was at home.       Esa Cochran MD

## 2021-10-21 ENCOUNTER — TRANSCRIBE ORDER (OUTPATIENT)
Dept: SCHEDULING | Age: 66
End: 2021-10-21

## 2021-10-21 DIAGNOSIS — Z12.31 ENCOUNTER FOR MAMMOGRAM TO ESTABLISH BASELINE MAMMOGRAM: Primary | ICD-10-CM

## 2021-11-23 ENCOUNTER — HOSPITAL ENCOUNTER (OUTPATIENT)
Dept: MAMMOGRAPHY | Age: 66
Discharge: HOME | End: 2021-11-23
Attending: OBSTETRICS & GYNECOLOGY
Payer: COMMERCIAL

## 2021-11-23 DIAGNOSIS — Z12.31 ENCOUNTER FOR MAMMOGRAM TO ESTABLISH BASELINE MAMMOGRAM: ICD-10-CM

## 2021-11-23 PROCEDURE — 77063 BREAST TOMOSYNTHESIS BI: CPT

## 2022-03-18 PROBLEM — R03.0 ELEVATED BP WITHOUT DIAGNOSIS OF HYPERTENSION: Status: ACTIVE | Noted: 2017-12-04

## 2022-03-19 PROBLEM — M54.2 NECK PAIN: Status: ACTIVE | Noted: 2017-08-09

## 2022-03-19 PROBLEM — M62.830 MUSCLE SPASM OF BACK: Status: ACTIVE | Noted: 2017-08-09

## 2022-03-19 PROBLEM — M85.89 OSTEOPENIA OF MULTIPLE SITES: Status: ACTIVE | Noted: 2017-08-28

## 2022-03-20 PROBLEM — G89.29 CHRONIC BILATERAL LOW BACK PAIN WITHOUT SCIATICA: Status: ACTIVE | Noted: 2017-08-09

## 2022-03-20 PROBLEM — M54.50 CHRONIC BILATERAL LOW BACK PAIN WITHOUT SCIATICA: Status: ACTIVE | Noted: 2017-08-09

## 2022-08-22 ENCOUNTER — TRANSCRIBE ORDER (OUTPATIENT)
Dept: SCHEDULING | Age: 67
End: 2022-08-22

## 2022-08-22 DIAGNOSIS — E78.00 PURE HYPERCHOLESTEROLEMIA: Primary | ICD-10-CM

## 2022-08-26 ENCOUNTER — HOSPITAL ENCOUNTER (OUTPATIENT)
Dept: CT IMAGING | Age: 67
Discharge: HOME | End: 2022-08-26
Attending: FAMILY MEDICINE
Payer: SELF-PAY

## 2022-08-26 DIAGNOSIS — E78.00 PURE HYPERCHOLESTEROLEMIA: ICD-10-CM

## 2022-08-26 PROCEDURE — 75571 CT HRT W/O DYE W/CA TEST: CPT

## 2023-03-16 ENCOUNTER — TRANSCRIBE ORDER (OUTPATIENT)
Dept: SCHEDULING | Age: 68
End: 2023-03-16

## 2023-03-16 DIAGNOSIS — Z12.31 SCREENING MAMMOGRAM FOR HIGH-RISK PATIENT: Primary | ICD-10-CM

## 2023-03-29 ENCOUNTER — HOSPITAL ENCOUNTER (OUTPATIENT)
Dept: MAMMOGRAPHY | Age: 68
Discharge: HOME | End: 2023-03-29
Attending: FAMILY MEDICINE
Payer: COMMERCIAL

## 2023-03-29 DIAGNOSIS — Z12.31 SCREENING MAMMOGRAM FOR HIGH-RISK PATIENT: ICD-10-CM

## 2023-03-29 PROCEDURE — 77063 BREAST TOMOSYNTHESIS BI: CPT

## 2024-03-14 ENCOUNTER — TRANSCRIBE ORDERS (OUTPATIENT)
Facility: HOSPITAL | Age: 69
End: 2024-03-14

## 2024-03-14 DIAGNOSIS — Z12.31 ENCOUNTER FOR SCREENING MAMMOGRAM FOR MALIGNANT NEOPLASM OF BREAST: Primary | ICD-10-CM

## 2024-04-01 ENCOUNTER — HOSPITAL ENCOUNTER (OUTPATIENT)
Facility: HOSPITAL | Age: 69
Discharge: HOME | End: 2024-04-04
Payer: MEDICARE

## 2024-04-01 VITALS — WEIGHT: 160 LBS | BODY MASS INDEX: 25.11 KG/M2 | HEIGHT: 67 IN

## 2024-04-01 DIAGNOSIS — Z12.31 ENCOUNTER FOR SCREENING MAMMOGRAM FOR MALIGNANT NEOPLASM OF BREAST: ICD-10-CM

## 2024-04-01 PROCEDURE — 77063 BREAST TOMOSYNTHESIS BI: CPT

## 2025-03-20 ENCOUNTER — TRANSCRIBE ORDERS (OUTPATIENT)
Facility: HOSPITAL | Age: 70
End: 2025-03-20

## 2025-03-20 DIAGNOSIS — Z12.31 OTHER SCREENING MAMMOGRAM: Primary | ICD-10-CM

## 2025-04-03 ENCOUNTER — HOSPITAL ENCOUNTER (OUTPATIENT)
Facility: HOSPITAL | Age: 70
Discharge: HOME | End: 2025-04-03
Payer: MEDICARE

## 2025-04-03 VITALS — BODY MASS INDEX: 25.27 KG/M2 | HEIGHT: 67 IN | WEIGHT: 161 LBS

## 2025-04-03 DIAGNOSIS — Z12.31 OTHER SCREENING MAMMOGRAM: ICD-10-CM

## 2025-04-03 PROCEDURE — 77063 BREAST TOMOSYNTHESIS BI: CPT

## 2025-06-14 ENCOUNTER — HOSPITAL ENCOUNTER (EMERGENCY)
Facility: HOSPITAL | Age: 70
Discharge: HOME | End: 2025-06-15
Attending: EMERGENCY MEDICINE
Payer: MEDICARE

## 2025-06-14 DIAGNOSIS — I10 POORLY-CONTROLLED HYPERTENSION: ICD-10-CM

## 2025-06-14 DIAGNOSIS — R07.9 CHEST PAIN, UNSPECIFIED TYPE: Primary | ICD-10-CM

## 2025-06-14 PROCEDURE — 93005 ELECTROCARDIOGRAM TRACING: CPT | Performed by: EMERGENCY MEDICINE

## 2025-06-14 PROCEDURE — 99285 EMERGENCY DEPT VISIT HI MDM: CPT

## 2025-06-14 ASSESSMENT — PAIN DESCRIPTION - LOCATION: LOCATION: CHEST

## 2025-06-14 ASSESSMENT — PAIN DESCRIPTION - FREQUENCY: FREQUENCY: CONTINUOUS

## 2025-06-14 ASSESSMENT — PAIN DESCRIPTION - DESCRIPTORS: DESCRIPTORS: PRESSURE

## 2025-06-14 ASSESSMENT — PAIN DESCRIPTION - ORIENTATION: ORIENTATION: MID

## 2025-06-14 ASSESSMENT — PAIN DESCRIPTION - PAIN TYPE: TYPE: ACUTE PAIN

## 2025-06-14 ASSESSMENT — PAIN DESCRIPTION - ONSET: ONSET: ON-GOING

## 2025-06-14 ASSESSMENT — PAIN - FUNCTIONAL ASSESSMENT
PAIN_FUNCTIONAL_ASSESSMENT: ACTIVITIES ARE NOT PREVENTED
PAIN_FUNCTIONAL_ASSESSMENT: 0-10

## 2025-06-14 ASSESSMENT — LIFESTYLE VARIABLES
HOW MANY STANDARD DRINKS CONTAINING ALCOHOL DO YOU HAVE ON A TYPICAL DAY: 1 OR 2
HOW OFTEN DO YOU HAVE A DRINK CONTAINING ALCOHOL: MONTHLY OR LESS

## 2025-06-14 ASSESSMENT — PAIN SCALES - GENERAL: PAINLEVEL_OUTOF10: 3

## 2025-06-15 ENCOUNTER — APPOINTMENT (OUTPATIENT)
Facility: HOSPITAL | Age: 70
End: 2025-06-15
Payer: MEDICARE

## 2025-06-15 VITALS
OXYGEN SATURATION: 97 % | RESPIRATION RATE: 17 BRPM | TEMPERATURE: 97.6 F | SYSTOLIC BLOOD PRESSURE: 183 MMHG | DIASTOLIC BLOOD PRESSURE: 75 MMHG | HEART RATE: 70 BPM

## 2025-06-15 LAB
ALBUMIN SERPL-MCNC: 4.1 G/DL (ref 3.5–5)
ALBUMIN/GLOB SERPL: 1.2 (ref 1.1–2.2)
ALP SERPL-CCNC: 103 U/L (ref 45–117)
ALT SERPL-CCNC: 32 U/L (ref 12–78)
ANION GAP SERPL CALC-SCNC: 8 MMOL/L (ref 2–12)
AST SERPL-CCNC: 28 U/L (ref 15–37)
BASOPHILS # BLD: 0.04 K/UL (ref 0–0.1)
BASOPHILS NFR BLD: 0.7 % (ref 0–1)
BILIRUB SERPL-MCNC: 0.4 MG/DL (ref 0.2–1)
BUN SERPL-MCNC: 15 MG/DL (ref 6–20)
BUN/CREAT SERPL: 20 (ref 12–20)
CALCIUM SERPL-MCNC: 8.9 MG/DL (ref 8.5–10.1)
CHLORIDE SERPL-SCNC: 96 MMOL/L (ref 97–108)
CO2 SERPL-SCNC: 26 MMOL/L (ref 21–32)
COMMENT:: NORMAL
CREAT SERPL-MCNC: 0.76 MG/DL (ref 0.55–1.02)
D DIMER PPP FEU-MCNC: <0.19 MG/L FEU (ref 0–0.65)
DIFFERENTIAL METHOD BLD: NORMAL
EOSINOPHIL # BLD: 0.14 K/UL (ref 0–0.4)
EOSINOPHIL NFR BLD: 2.4 % (ref 0–7)
ERYTHROCYTE [DISTWIDTH] IN BLOOD BY AUTOMATED COUNT: 13 % (ref 11.5–14.5)
GLOBULIN SER CALC-MCNC: 3.3 G/DL (ref 2–4)
GLUCOSE SERPL-MCNC: 93 MG/DL (ref 65–100)
HCT VFR BLD AUTO: 38.3 % (ref 35–47)
HGB BLD-MCNC: 12.4 G/DL (ref 11.5–16)
IMM GRANULOCYTES # BLD AUTO: 0.01 K/UL (ref 0–0.04)
IMM GRANULOCYTES NFR BLD AUTO: 0.2 % (ref 0–0.5)
LYMPHOCYTES # BLD: 2.02 K/UL (ref 0.8–3.5)
LYMPHOCYTES NFR BLD: 34.9 % (ref 12–49)
MCH RBC QN AUTO: 30.7 PG (ref 26–34)
MCHC RBC AUTO-ENTMCNC: 32.4 G/DL (ref 30–36.5)
MCV RBC AUTO: 94.8 FL (ref 80–99)
MONOCYTES # BLD: 0.55 K/UL (ref 0–1)
MONOCYTES NFR BLD: 9.5 % (ref 5–13)
NEUTS SEG # BLD: 3.03 K/UL (ref 1.8–8)
NEUTS SEG NFR BLD: 52.3 % (ref 32–75)
NRBC # BLD: 0 K/UL (ref 0–0.01)
NRBC BLD-RTO: 0 PER 100 WBC
PLATELET # BLD AUTO: 231 K/UL (ref 150–400)
PMV BLD AUTO: 10 FL (ref 8.9–12.9)
POTASSIUM SERPL-SCNC: 3.4 MMOL/L (ref 3.5–5.1)
PROT SERPL-MCNC: 7.4 G/DL (ref 6.4–8.2)
RBC # BLD AUTO: 4.04 M/UL (ref 3.8–5.2)
SODIUM SERPL-SCNC: 130 MMOL/L (ref 136–145)
SPECIMEN HOLD: NORMAL
TROPONIN I SERPL HS-MCNC: 4 NG/L (ref 0–51)
TROPONIN I SERPL HS-MCNC: 6 NG/L (ref 0–51)
TROPONIN I SERPL HS-MCNC: 9 NG/L (ref 0–51)
WBC # BLD AUTO: 5.8 K/UL (ref 3.6–11)

## 2025-06-15 PROCEDURE — 85025 COMPLETE CBC W/AUTO DIFF WBC: CPT

## 2025-06-15 PROCEDURE — 84484 ASSAY OF TROPONIN QUANT: CPT

## 2025-06-15 PROCEDURE — 71045 X-RAY EXAM CHEST 1 VIEW: CPT

## 2025-06-15 PROCEDURE — 80053 COMPREHEN METABOLIC PANEL: CPT

## 2025-06-15 PROCEDURE — 85379 FIBRIN DEGRADATION QUANT: CPT

## 2025-06-15 ASSESSMENT — PAIN SCALES - GENERAL: PAINLEVEL_OUTOF10: 0

## 2025-06-15 NOTE — ED PROVIDER NOTES
3.50 K/UL Final    Monocytes Absolute 06/15/2025 0.55  0.00 - 1.00 K/UL Final    Eosinophils Absolute 06/15/2025 0.14  0.00 - 0.40 K/UL Final    Basophils Absolute 06/15/2025 0.04  0.00 - 0.10 K/UL Final    Immature Granulocytes Absolute 06/15/2025 0.01  0.00 - 0.04 K/UL Final    Differential Type 06/15/2025 AUTOMATED    Final    Sodium 06/15/2025 130 (L)  136 - 145 mmol/L Final    Potassium 06/15/2025 3.4 (L)  3.5 - 5.1 mmol/L Final    Chloride 06/15/2025 96 (L)  97 - 108 mmol/L Final    CO2 06/15/2025 26  21 - 32 mmol/L Final    Anion Gap 06/15/2025 8  2 - 12 mmol/L Final    PLEASE NOTE NEW REFERENCE RANGE    Glucose 06/15/2025 93  65 - 100 mg/dL Final    BUN 06/15/2025 15  6 - 20 MG/DL Final    Creatinine 06/15/2025 0.76  0.55 - 1.02 MG/DL Final    BUN/Creatinine Ratio 06/15/2025 20  12 - 20   Final    Est, Glom Filt Rate 06/15/2025 85  >60 ml/min/1.73m2 Final    Comment:    Pediatric calculator link: https://www.kidney.org/professionals/kdoqi/gfr_calculatorped     These results are not intended for use in patients <18 years of age.     eGFR results are calculated without a race factor using  the 2021 CKD-EPI equation. Careful clinical correlation is recommended, particularly when comparing to results calculated using previous equations.  The CKD-EPI equation is less accurate in patients with extremes of muscle mass, extra-renal metabolism of creatinine, excessive creatine ingestion, or following therapy that affects renal tubular secretion.      Calcium 06/15/2025 8.9  8.5 - 10.1 MG/DL Final    Total Bilirubin 06/15/2025 0.4  0.2 - 1.0 MG/DL Final    ALT 06/15/2025 32  12 - 78 U/L Final    AST 06/15/2025 28  15 - 37 U/L Final    Alk Phosphatase 06/15/2025 103  45 - 117 U/L Final    Total Protein 06/15/2025 7.4  6.4 - 8.2 g/dL Final    Albumin 06/15/2025 4.1  3.5 - 5.0 g/dL Final    Globulin 06/15/2025 3.3  2.0 - 4.0 g/dL Final    Albumin/Globulin Ratio 06/15/2025 1.2  1.1 - 2.2   Final    Specimen HOld

## 2025-06-15 NOTE — ED TRIAGE NOTES
Pt arrives via EMS from home w/ cc of CP x40 minutes. Pt says her chest feels tight.    Pt did have an episode of SOB that has since resolved.     Pt denies any cardiac history or any new leg swelling.

## 2025-06-16 LAB
EKG ATRIAL RATE: 70 BPM
EKG DIAGNOSIS: NORMAL
EKG P AXIS: 43 DEGREES
EKG P-R INTERVAL: 196 MS
EKG Q-T INTERVAL: 394 MS
EKG QRS DURATION: 92 MS
EKG QTC CALCULATION (BAZETT): 425 MS
EKG R AXIS: 21 DEGREES
EKG T AXIS: 58 DEGREES
EKG VENTRICULAR RATE: 70 BPM